# Patient Record
(demographics unavailable — no encounter records)

---

## 2025-01-02 NOTE — ASSESSMENT
[Use of independent historian: [ enter independent historian's relationship to patient ] :____] : As the patient was unable to provide a complete and reliable history, I obtained clinical history from the patient's [unfilled] [FreeTextEntry1] : #Atopic dermatitis, mod-severe Chronic condition, flaring - Discussed diagnosis, natural course and treatment plan for disease flares and maintenance strategies to prevent future flares  - Before mealtime apply vaseline liberally to face - Avoid fragrances  Start boot camp- apply in this order: - When SEVERE/RED (this week) - dilute bleach baths or CLN body wash daily, rinse with plain water. No other soap BODY: Apply topical mometasone to AA  twice daily + 2 days prn roughness, SED FACE: Apply desonide 0.05% ointment to face twice daily + 2 days, avoid around eyes.  - Apply plain vaseline liberally - Wet wraps and Modified wet wraps discussed, prescriptions always on skin first , then Vaseline then wet socks/pajamas, then dry pajamas  - Repeat daily as needed  When MODERATE/YELLOW  - Dilute bleach baths or CLN body wash twice weekly. Daily baths with plain water  BODY: Apply topical mometasone to AA  twice daily PRN roughness. + 2 days FACE: Apply hydrocortisone 2.5% ointment to face twice daily PRN roughness + 2 days, avoid around eyes.  - liberal vaseline  #Post-inflammatory hypepigmentation from above   - Discussed natural history and slow time course for resolution    RTC 3 weeks

## 2025-01-02 NOTE — HISTORY OF PRESENT ILLNESS
[FreeTextEntry1] : NP: eczema [de-identified] : ANDREW MILTON is a 5 month old who is presenting for evaluation of:  #Eczema - Started at 2 months of age, flaring in the past month. Pediatrician recommended gentle skincare. Very itchy at night, scratching and bleeding at night. Dad with hx of eczema.   S: Aveeno oats M: Shea moisture sensitive skin, Aquaphor D: Donn

## 2025-01-02 NOTE — PHYSICAL EXAM
[Alert] : alert [Well Nourished] : well nourished [Conjunctiva Non-injected] : conjunctiva non-injected [No Visual Lymphadenopathy] : no visual  lymphadenopathy [No Clubbing] : no clubbing [No Edema] : no edema [No Bromhidrosis] : no bromhidrosis [No Chromhidrosis] : no chromhidrosis [FreeTextEntry3] : - rough eczematous patches on face, trunk, extremities with excoriations

## 2025-01-02 NOTE — CONSULT LETTER
[Dear  ___] : Dear  [unfilled], [Consult Letter:] : I had the pleasure of evaluating your patient, [unfilled]. [Please see my note below.] : Please see my note below. [Consult Closing:] : Thank you very much for allowing me to participate in the care of this patient.  If you have any questions, please do not hesitate to contact me. [Sincerely,] : Sincerely, [FreeTextEntry3] : Reba Singh MD  [facility]